# Patient Record
Sex: MALE | Race: WHITE | NOT HISPANIC OR LATINO | Employment: FULL TIME | URBAN - METROPOLITAN AREA
[De-identification: names, ages, dates, MRNs, and addresses within clinical notes are randomized per-mention and may not be internally consistent; named-entity substitution may affect disease eponyms.]

---

## 2022-04-07 NOTE — PROGRESS NOTES
CARDIOLOGY CONSULTATION NOTE      Date of Visit: 4/29/2022    Primary Care Provider: Pcp Not In Computer  Referring Provider: No ref. provider found    Patient Name: Jg Ritter  YOB: 1990  MRN: [id number]     Reason for Visit:   Palpitations, chest discomfort     Patient Story:   Jg Ritter is a 31 year-old gentleman with no remarkable past cardiac history. Briefly, he recently started Ritalin for management of ADHD.  After starting Ritalin, he noticed that he would intermittently have a fluttering sensation in his chest associated with strong heartbeats and very mild chest pain/discomfort.  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  Therefore, he made a referral for evaluation by Cardiology.    He presents today for consultation.  He states that he stopped taking Ritalin after noticing the symptoms.  Since then, his symptoms seem to have improved.  He also reports that exercise seem to improve his symptoms as well.  He denies having significant chest pain/discomfort at peak exercise or with activities such as walking up stairs.  He also has not had significant lightheadedness or episodes of radha syncope.  He notes that his symptoms usually only last a few seconds and he denies prolonged episodes of racing heart rates or irregular heart rates.  He denies any known personal history of cardiovascular disease.  His grandfather did suffer from a heart attack at age 75.  He also denies any known history of severe hyperlipidemia, diabetes, or hypertension.  He is a non-smoker.  He does note that he has been under stress recently prior to finals and he is planning on applying to medical school next year.     Medications and Allergies:     Current Outpatient Medications   Medication Sig Dispense Refill   • Probiotic Product (PROBIOTIC ADVANCED PO) Take  by mouth.     •  "Omega-3 Fatty Acids (FISH OIL OMEGA-3 PO) Take  by mouth.       No current facility-administered medications for this visit.     No Known Allergies     Medical Decision Making:   # Palpitations, chest discomfort: He describes what sounds like benign palpitations without red flag symptoms or exertional symptoms.  Overall, he is at very low risk of epicardial coronary artery disease.  We will further evaluate his symptoms with cardiac monitoring.  If this does not show any significant abnormalities, then no further testing is likely necessary.  Additionally, if his cardiac monitoring is not concerning, then he can likely restart Ritalin.  I did discuss that Ritalin may worsen palpitations, but these are usually benign in etiology and do not require therapy unless very symptomatic.    # Abnormal ECG: I reviewed his ECG.  I do not see significant evidence of left atrial enlargement.  He did have slightly poor R-wave progression.  We discussed that this can be related to lead placement or normal variations in the rotation of the heart within the chest.  Given his risk factors, I think that it is very unlikely that he has suffered a prior anteroseptal infarct.  If his cardiac monitoring does not show any significant abnormalities, again, no further testing is likely necessary.    Follow Up  As needed pending review of cardiac monitor     Cardiac Studies and Procedures:   Electrophysiology  ECG (4/2/2022)-independently interpreted  Normal sinus rhythm with poor R-wave progression     Vital Signs:   /90 (BP Location: Left arm, Patient Position: Sitting)   Pulse 70   Resp 16   Ht 1.778 m (5' 10\")   Wt 81.2 kg (179 lb)   SpO2 96%    BP Readings from Last 4 Encounters:   04/29/22 120/90   05/24/16 115/62     Wt Readings from Last 4 Encounters:   04/29/22 81.2 kg (179 lb)   05/24/16 71.8 kg (158 lb 4.6 oz)     Body mass index is 25.68 kg/m².     Laboratories:   Lipids  None available for review    GFR  Lab Results "   Component Value Date/Time    IFNOTAFR >60 05/24/2016 1926     Lab Results   Component Value Date/Time    IFAFRICA >60 05/24/2016 1926     Chemistries  Lab Results   Component Value Date/Time    CREATININE 0.87 05/24/2016 1926     Lab Results   Component Value Date/Time    BUN 19 05/24/2016 1926     Lab Results   Component Value Date/Time    POTASSIUM 3.8 05/24/2016 1926     Lab Results   Component Value Date/Time    SODIUM 136 05/24/2016 1926     Lab Results   Component Value Date/Time    GLUCOSE 108 (H) 05/24/2016 1926     Lab Results   Component Value Date/Time    ASTSGOT 17 05/24/2016 1926     Lab Results   Component Value Date/Time    ALTSGPT 12 05/24/2016 1926     Lab Results   Component Value Date/Time    ALKPHOSPHAT 61 05/24/2016 1926     Blood Counts  Lab Results   Component Value Date/Time    HEMOGLOBIN 16.9 05/24/2016 1926     Lab Results   Component Value Date/Time    PLATELETCT 169 05/24/2016 1926     Lab Results   Component Value Date/Time    WBC 13.4 (H) 05/24/2016 1926      Physical Examination:   General: Well-appearing, no acute distress  Eyes: Extraocular movements intact, anicteric  Neck: Full range of motion, no gross jugular venous distension  Pulmonary: Normal respiratory effort, no distress  Cardiovascular: Regular rate and rhythm, no murmurs or gallops appreciated  Extremities: Warm and well perfused, no significant lower extremity edema  Neurological: Alert and oriented, no gross focal motor deficits     Past History:   Past Medical History  The patient's past medical history was reviewed.  See HPI and self-reported patient medical history form for pertinent medical history to consultation.    Past Social History  The patient's social history was reviewed.  See [facility] self-reported patient medical history form for pertinent social history to consultation.    Past Family History  The patient's family history was reviewed.  See [facility] [place] [facility] self-reported patient medical history form for pertinent family  history to consultation.    Review of Systems  A pertinent cardiac review of systems was performed and was otherwise unremarkable except as per HPI and self-reported patient medical history form.        Naresh Garcia MD, [facility]  Interventional Cardiology  [facility], [facility]  [address] [address]  Phone: [phone]  Fax: [phone]

## 2022-04-18 ENCOUNTER — TELEPHONE (OUTPATIENT)
Dept: CARDIOLOGY | Facility: MEDICAL CENTER | Age: 32
End: 2022-04-18
Payer: COMMERCIAL

## 2022-04-29 ENCOUNTER — NON-PROVIDER VISIT (OUTPATIENT)
Dept: CARDIOLOGY | Facility: MEDICAL CENTER | Age: 32
End: 2022-04-29
Attending: INTERNAL MEDICINE
Payer: COMMERCIAL

## 2022-04-29 ENCOUNTER — OFFICE VISIT (OUTPATIENT)
Dept: CARDIOLOGY | Facility: MEDICAL CENTER | Age: 32
End: 2022-04-29
Payer: COMMERCIAL

## 2022-04-29 VITALS
WEIGHT: 179 LBS | SYSTOLIC BLOOD PRESSURE: 121 MMHG | OXYGEN SATURATION: 96 % | HEART RATE: 70 BPM | DIASTOLIC BLOOD PRESSURE: 88 MMHG | HEIGHT: 70 IN | RESPIRATION RATE: 16 BRPM | BODY MASS INDEX: 25.62 KG/M2

## 2022-04-29 DIAGNOSIS — R00.2 PALPITATIONS: ICD-10-CM

## 2022-04-29 DIAGNOSIS — R94.31 ABNORMAL ECG: ICD-10-CM

## 2022-04-29 DIAGNOSIS — I49.3 PVC'S (PREMATURE VENTRICULAR CONTRACTIONS): ICD-10-CM

## 2022-04-29 DIAGNOSIS — F90.9 ATTENTION DEFICIT HYPERACTIVITY DISORDER (ADHD), UNSPECIFIED ADHD TYPE: ICD-10-CM

## 2022-04-29 PROCEDURE — 99214 OFFICE O/P EST MOD 30 MIN: CPT | Performed by: INTERNAL MEDICINE

## 2022-04-29 NOTE — PROGRESS NOTES
Patient enrolled in the 14 day ePatch Holter monitoring program from eb Smyth M.D.  >In clinic hook up, monitor serial #[id number].  >Pending EOS.

## 2022-05-25 ENCOUNTER — TELEPHONE (OUTPATIENT)
Dept: CARDIOLOGY | Facility: MEDICAL CENTER | Age: 32
End: 2022-05-25
Payer: COMMERCIAL

## 2022-05-26 ENCOUNTER — TELEPHONE (OUTPATIENT)
Dept: CARDIOLOGY | Facility: MEDICAL CENTER | Age: 32
End: 2022-05-26
Payer: COMMERCIAL

## 2022-05-26 ENCOUNTER — PATIENT MESSAGE (OUTPATIENT)
Dept: CARDIOLOGY | Facility: MEDICAL CENTER | Age: 32
End: 2022-05-26
Payer: COMMERCIAL

## 2022-05-26 PROCEDURE — 93228 REMOTE 30 DAY ECG REV/REPORT: CPT | Performed by: INTERNAL MEDICINE

## 2022-05-26 NOTE — TELEPHONE ENCOUNTER
CHAR Parra Pt is returning your call. Tried to reach you, no answer. Please call him back @ (ph) [phone].    Thank You  Natalie KATZ

## 2022-05-26 NOTE — TELEPHONE ENCOUNTER
....Caller: Jg Frye    Topic/issue: Returning your call  Callback Number:[phone] (home)     Thank you,  Rochelle CAT

## 2022-05-26 NOTE — TELEPHONE ENCOUNTER
----- Message from Naresh Garcia M.D. sent at 5/26/2022  9:08 AM PDT -----  Please let the patient know that their monitored showed no concerning findings. There was very minor ectopy, which is typically a normal finding.  No follow-up needed unless the patient is continue to have concerning symptoms.
